# Patient Record
Sex: MALE | Race: WHITE | NOT HISPANIC OR LATINO
[De-identification: names, ages, dates, MRNs, and addresses within clinical notes are randomized per-mention and may not be internally consistent; named-entity substitution may affect disease eponyms.]

---

## 2017-01-05 ENCOUNTER — APPOINTMENT (OUTPATIENT)
Dept: OPHTHALMOLOGY | Facility: CLINIC | Age: 62
End: 2017-01-05

## 2017-01-05 PROBLEM — Z00.00 ENCOUNTER FOR PREVENTIVE HEALTH EXAMINATION: Status: ACTIVE | Noted: 2017-01-05

## 2017-01-09 ENCOUNTER — APPOINTMENT (OUTPATIENT)
Dept: OPHTHALMOLOGY | Facility: CLINIC | Age: 62
End: 2017-01-09

## 2017-01-20 ENCOUNTER — APPOINTMENT (OUTPATIENT)
Dept: OPHTHALMOLOGY | Facility: CLINIC | Age: 62
End: 2017-01-20

## 2017-02-03 ENCOUNTER — APPOINTMENT (OUTPATIENT)
Dept: OPHTHALMOLOGY | Facility: CLINIC | Age: 62
End: 2017-02-03

## 2017-03-03 ENCOUNTER — APPOINTMENT (OUTPATIENT)
Dept: OPHTHALMOLOGY | Facility: CLINIC | Age: 62
End: 2017-03-03

## 2017-03-03 DIAGNOSIS — Z51.81 ENCOUNTER FOR THERAPEUTIC DRUG LVL MONITORING: ICD-10-CM

## 2017-03-03 DIAGNOSIS — H53.8 OTHER VISUAL DISTURBANCES: ICD-10-CM

## 2017-03-03 DIAGNOSIS — H20.021 RECURRENT ACUTE IRIDOCYCLITIS, RIGHT EYE: ICD-10-CM

## 2017-03-06 PROBLEM — H20.021 IRITIS, RECURRENT, RIGHT: Status: ACTIVE | Noted: 2017-01-20

## 2017-03-06 PROBLEM — H53.8 BLURRED VISION, RIGHT EYE: Status: ACTIVE | Noted: 2017-01-20

## 2017-03-06 PROBLEM — Z51.81 ENCOUNTER FOR THERAPEUTIC DRUG MONITORING: Status: ACTIVE | Noted: 2017-01-20

## 2017-12-14 ENCOUNTER — RESULT REVIEW (OUTPATIENT)
Age: 62
End: 2017-12-14

## 2019-04-23 ENCOUNTER — APPOINTMENT (OUTPATIENT)
Dept: GASTROENTEROLOGY | Facility: CLINIC | Age: 64
End: 2019-04-23
Payer: COMMERCIAL

## 2019-04-23 VITALS
SYSTOLIC BLOOD PRESSURE: 110 MMHG | HEART RATE: 64 BPM | HEIGHT: 71 IN | DIASTOLIC BLOOD PRESSURE: 87 MMHG | WEIGHT: 190 LBS | BODY MASS INDEX: 26.6 KG/M2

## 2019-04-23 DIAGNOSIS — K62.5 HEMORRHAGE OF ANUS AND RECTUM: ICD-10-CM

## 2019-04-23 PROCEDURE — 99244 OFF/OP CNSLTJ NEW/EST MOD 40: CPT

## 2019-04-23 NOTE — PHYSICAL EXAM
[General Appearance - Alert] : alert [Outer Ear] : the ears and nose were normal in appearance [General Appearance - In No Acute Distress] : in no acute distress [Sclera] : the sclera and conjunctiva were normal [Neck Appearance] : the appearance of the neck was normal [] : no respiratory distress [Abdomen Tenderness] : non-tender [Bowel Sounds] : normal bowel sounds [Abdomen Soft] : soft [Abnormal Walk] : normal gait [Skin Color & Pigmentation] : normal skin color and pigmentation [Cranial Nerves] : cranial nerves 2-12 were intact [Oriented To Time, Place, And Person] : oriented to person, place, and time

## 2019-04-23 NOTE — HISTORY OF PRESENT ILLNESS
[FreeTextEntry1] : Mr. Hamlin is a pleasant 63M h/o prostate cancer s/p radical prostatectomy who is seen at the request of Dr. Hernandez for evaluation of rectal bleeding.\par \par He states that over the past 2 years, he has had 3-4 episodes of rectal bleeding, where he will have a large amount of bright red blood with clots associated with a bowel movement.  Will continue for up to 3 hours, at which time it will stop on its own.  Has never sought medical advice acutely for this.  Has not seen any blood mixed with stool. Stool is brown and separate from the blood.  The blood will stain the toilet water.  No pain.  No diarrhea or constipation.  No weight change.\par \par Had a colonoscopy within the past 3-4 years in [place], does not have the procedure report however he does remember being told he had diverticulosis.\par \par Denies black stool.\par \par Had an episode that occurred again a few days ago, has since resolved, now having normal brown BMs.\par \par Had labs drawn today (in [facility]):\par WBC 4.7\par H/H 14.4\par Hct 42.6\par Plat 164\par Normal CMP\par \par No prescribed or OTC medications.

## 2019-04-23 NOTE — ASSESSMENT
[FreeTextEntry1] : Will plan on a colonoscopy for rectal bleeding.  Explained risks/benefits/alternatives including not limited to bleeding, infection, perforation, missed lesions, anesthesia complications.  Patient understands and agrees, all questions answered.  Will use a split dose miralax/gatorade prep with clears the day prior.\par \par Thank you for referring Mr. PAT.  Please do not hesitate to call to further discuss his/her care.\par \par Note faxed to requesting MD.\par \par

## 2019-04-30 ENCOUNTER — APPOINTMENT (OUTPATIENT)
Dept: GASTROENTEROLOGY | Facility: HOSPITAL | Age: 64
End: 2019-04-30